# Patient Record
Sex: FEMALE | URBAN - METROPOLITAN AREA
[De-identification: names, ages, dates, MRNs, and addresses within clinical notes are randomized per-mention and may not be internally consistent; named-entity substitution may affect disease eponyms.]

---

## 2018-10-02 ENCOUNTER — EMERGENCY (EMERGENCY)
Facility: HOSPITAL | Age: 48
LOS: 0 days | Discharge: HOME | End: 2018-10-02
Attending: EMERGENCY MEDICINE | Admitting: EMERGENCY MEDICINE
Payer: OTHER MISCELLANEOUS

## 2018-10-02 VITALS
DIASTOLIC BLOOD PRESSURE: 84 MMHG | SYSTOLIC BLOOD PRESSURE: 121 MMHG | OXYGEN SATURATION: 100 % | RESPIRATION RATE: 15 BRPM | TEMPERATURE: 98 F | HEART RATE: 76 BPM

## 2018-10-02 VITALS — WEIGHT: 134.92 LBS

## 2018-10-02 DIAGNOSIS — S83.91XA SPRAIN OF UNSPECIFIED SITE OF RIGHT KNEE, INITIAL ENCOUNTER: ICD-10-CM
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
## 2018-10-02 PROCEDURE — 99283 EMERGENCY DEPT VISIT LOW MDM: CPT

## 2018-10-02 PROCEDURE — 73562 X-RAY EXAM OF KNEE 3: CPT | Mod: 26,RT

## 2018-10-02 RX ORDER — IBUPROFEN 200 MG
600 TABLET ORAL ONCE
Qty: 0 | Refills: 0 | Status: COMPLETED | OUTPATIENT
Start: 2018-10-02 | End: 2018-10-02

## 2018-10-02 RX ADMIN — Medication 600 MILLIGRAM(S): at 19:38

## 2018-10-02 NOTE — ED STATDOCS - PROGRESS NOTE DETAILS
right knee immobilizer applied. Patient is feeling much better, tests/labs reviewed. case discussed with attending. OK to dc home. LJ Mcgovern

## 2018-10-02 NOTE — ED ADULT NURSE NOTE - NSIMPLEMENTINTERV_GEN_ALL_ED
Implemented All Universal Safety Interventions:  [place] to call system. Call bell, personal items and telephone within reach. Instruct patient to call for assistance. Room bathroom lighting operational. Non-slip footwear when patient is off stretcher. Physically safe environment: no spills, clutter or unnecessary equipment. Stretcher in lowest position, wheels locked, appropriate side rails in place.

## 2018-10-02 NOTE — ED STATDOCS - OBJECTIVE STATEMENT
49 y/o female with no relevant PMHx presents to the ED c/o knee pain. Pt notes she was at work, and heard "a bubble pop" in her right knee. Pt notes she can bend her knee but can not bear weight. No history of meniscus tears. Denies back pain, neck pain. Has not taken any medication for pain. Allergies: Penicillin. No other complaints at this time.

## 2018-10-02 NOTE — ED STATDOCS - PHYSICAL EXAMINATION
GEN: AOX3, NAD. HEENT: Throat clear. Head NC/AT. NECK: Supple, No JVD. FROM. C-spine non-tender. CV:S1S2, RRR, LUNGS: CTA b/l, no w/r/r. ABD: Soft, NT/ND, no rebound, no guarding. No CVAT. EXT: No e/c/c. 2+ distal pulses. RIGHT KNEE: +Minimal STS anterior right knee. +Tenderness. Painful ROM. No bony deformity. No obvious ligament laxity Able to straight raise and bear weight RLE with mild pain. SKIN: No rashes. NEURO: No focal deficits. CN II-XII intact. FROM. 5/5 motor and sensory. LJ Mcgovern

## 2018-10-02 NOTE — ED ADULT TRIAGE NOTE - CHIEF COMPLAINT QUOTE
Pt presents to ED c/o right knee pain. Pt reports she was walking down the hallway and "heard a pop".

## 2018-10-02 NOTE — ED STATDOCS - CARE PROVIDER_API CALL
Lester Tse (MD), Orthopaedic Surgery  [address] [place] [address]  Phone: [phone]  Fax: [phone]

## 2018-10-02 NOTE — ED STATDOCS - ATTENDING CONTRIBUTION TO CARE
I, Dean Garcia, performed the initial face to face bedside interview with this patient regarding history of present illness, review of symptoms and relevant past medical, social and family history.  I completed an independent physical examination.  I was the initial provider who evaluated this patient. I have signed out the follow up of any pending tests (i.e. labs, radiological studies) to the ACP.  I have communicated the patient’s plan of care and disposition with the ACP.  The history, relevant review of systems, past medical and surgical history, medical decision making, and physical examination was documented by the scribe in my presence and I attest to the accuracy of the documentation.

## 2019-08-05 ENCOUNTER — OUTPATIENT (OUTPATIENT)
Dept: INPATIENT UNIT | Facility: HOSPITAL | Age: 49
LOS: 1 days | Discharge: HOME | End: 2019-08-05
Payer: COMMERCIAL

## 2019-08-05 ENCOUNTER — RESULT REVIEW (OUTPATIENT)
Age: 49
End: 2019-08-05

## 2019-08-05 VITALS
HEIGHT: 64 IN | WEIGHT: 142.42 LBS | RESPIRATION RATE: 16 BRPM | DIASTOLIC BLOOD PRESSURE: 77 MMHG | OXYGEN SATURATION: 97 % | SYSTOLIC BLOOD PRESSURE: 113 MMHG | HEART RATE: 92 BPM | TEMPERATURE: 98 F

## 2019-08-05 VITALS
RESPIRATION RATE: 16 BRPM | TEMPERATURE: 98 F | DIASTOLIC BLOOD PRESSURE: 71 MMHG | OXYGEN SATURATION: 99 % | HEART RATE: 66 BPM | SYSTOLIC BLOOD PRESSURE: 115 MMHG

## 2019-08-05 DIAGNOSIS — N30.20 OTHER CHRONIC CYSTITIS WITHOUT HEMATURIA: ICD-10-CM

## 2019-08-05 DIAGNOSIS — C67.3 MALIGNANT NEOPLASM OF ANTERIOR WALL OF BLADDER: ICD-10-CM

## 2019-08-05 DIAGNOSIS — E78.5 HYPERLIPIDEMIA, UNSPECIFIED: ICD-10-CM

## 2019-08-05 DIAGNOSIS — J45.909 UNSPECIFIED ASTHMA, UNCOMPLICATED: ICD-10-CM

## 2019-08-05 DIAGNOSIS — N32.89 OTHER SPECIFIED DISORDERS OF BLADDER: ICD-10-CM

## 2019-08-05 DIAGNOSIS — D41.4 NEOPLASM OF UNCERTAIN BEHAVIOR OF BLADDER: ICD-10-CM

## 2019-08-05 DIAGNOSIS — E11.9 TYPE 2 DIABETES MELLITUS WITHOUT COMPLICATIONS: ICD-10-CM

## 2019-08-05 DIAGNOSIS — Z88.0 ALLERGY STATUS TO PENICILLIN: ICD-10-CM

## 2019-08-05 PROCEDURE — 88307 TISSUE EXAM BY PATHOLOGIST: CPT

## 2019-08-05 PROCEDURE — 88342 IMHCHEM/IMCYTCHM 1ST ANTB: CPT | Mod: 26

## 2019-08-05 PROCEDURE — 88342 IMHCHEM/IMCYTCHM 1ST ANTB: CPT

## 2019-08-05 PROCEDURE — 94640 AIRWAY INHALATION TREATMENT: CPT

## 2019-08-05 PROCEDURE — 88307 TISSUE EXAM BY PATHOLOGIST: CPT | Mod: 26

## 2019-08-05 PROCEDURE — 82962 GLUCOSE BLOOD TEST: CPT

## 2019-08-05 RX ORDER — FENTANYL CITRATE 50 UG/ML
25 INJECTION INTRAVENOUS
Refills: 0 | Status: DISCONTINUED | OUTPATIENT
Start: 2019-08-05 | End: 2019-08-05

## 2019-08-05 RX ORDER — FAMOTIDINE 10 MG/ML
20 INJECTION INTRAVENOUS ONCE
Refills: 0 | Status: COMPLETED | OUTPATIENT
Start: 2019-08-05 | End: 2019-08-05

## 2019-08-05 RX ORDER — OXYCODONE HYDROCHLORIDE 5 MG/1
5 TABLET ORAL ONCE
Refills: 0 | Status: DISCONTINUED | OUTPATIENT
Start: 2019-08-05 | End: 2019-08-05

## 2019-08-05 RX ORDER — ACETAMINOPHEN 500 MG
975 TABLET ORAL ONCE
Refills: 0 | Status: COMPLETED | OUTPATIENT
Start: 2019-08-05 | End: 2019-08-05

## 2019-08-05 RX ORDER — ONDANSETRON 8 MG/1
4 TABLET, FILM COATED ORAL ONCE
Refills: 0 | Status: DISCONTINUED | OUTPATIENT
Start: 2019-08-05 | End: 2019-08-05

## 2019-08-05 RX ORDER — FENTANYL CITRATE 50 UG/ML
50 INJECTION INTRAVENOUS
Refills: 0 | Status: DISCONTINUED | OUTPATIENT
Start: 2019-08-05 | End: 2019-08-05

## 2019-08-05 RX ORDER — ALBUTEROL 90 UG/1
2.5 AEROSOL, METERED ORAL ONCE
Refills: 0 | Status: COMPLETED | OUTPATIENT
Start: 2019-08-05 | End: 2019-08-05

## 2019-08-05 RX ORDER — EMPAGLIFLOZIN 10 MG/1
1 TABLET, FILM COATED ORAL
Qty: 0 | Refills: 0 | DISCHARGE

## 2019-08-05 RX ADMIN — Medication 975 MILLIGRAM(S): at 08:18

## 2019-08-05 RX ADMIN — ALBUTEROL 2.5 MILLIGRAM(S): 90 AEROSOL, METERED ORAL at 07:47

## 2019-08-05 RX ADMIN — FAMOTIDINE 20 MILLIGRAM(S): 10 INJECTION INTRAVENOUS at 08:17

## 2019-08-05 RX ADMIN — Medication 975 MILLIGRAM(S): at 08:17

## 2019-08-05 NOTE — ASU DISCHARGE PLAN (ADULT/PEDIATRIC) - CARE PROVIDER_API CALL
Yayo Bach)  Urology  [address] [place] [address]  Phone: [phone]  Fax: [phone]  Follow Up Time:

## 2019-08-05 NOTE — BRIEF OPERATIVE NOTE - NSICDXBRIEFPROCEDURE_GEN_ALL_CORE_FT
PROCEDURES:  Cystoscopy with transurethral resection of prostate (TURP) and transurethral resection of bladder tumor (TURBT) 05-Aug-2019 09:33:48  Yayo Bach

## 2019-08-05 NOTE — ASU PATIENT PROFILE, ADULT - PMH
Asthma    Diabetes 1.5, managed as type 2    Hyperlipidemia associated with type 2 diabetes mellitus    No pertinent past medical history

## 2021-01-13 ENCOUNTER — OUTPATIENT (OUTPATIENT)
Dept: OUTPATIENT SERVICES | Facility: HOSPITAL | Age: 51
LOS: 1 days | End: 2021-01-13
Payer: COMMERCIAL

## 2021-01-13 DIAGNOSIS — Z20.828 CONTACT WITH AND (SUSPECTED) EXPOSURE TO OTHER VIRAL COMMUNICABLE DISEASES: ICD-10-CM

## 2021-01-13 LAB — SARS-COV-2 RNA SPEC QL NAA+PROBE: SIGNIFICANT CHANGE UP

## 2021-01-13 PROCEDURE — C9803: CPT

## 2021-01-13 PROCEDURE — U0005: CPT

## 2021-01-13 PROCEDURE — U0003: CPT

## 2021-01-14 DIAGNOSIS — Z20.828 CONTACT WITH AND (SUSPECTED) EXPOSURE TO OTHER VIRAL COMMUNICABLE DISEASES: ICD-10-CM

## 2021-01-14 PROBLEM — E13.9 OTHER SPECIFIED DIABETES MELLITUS WITHOUT COMPLICATIONS: Chronic | Status: ACTIVE | Noted: 2019-08-05

## 2021-01-14 PROBLEM — E11.69 TYPE 2 DIABETES MELLITUS WITH OTHER SPECIFIED COMPLICATION: Chronic | Status: ACTIVE | Noted: 2019-08-05

## 2021-01-14 PROBLEM — J45.909 UNSPECIFIED ASTHMA, UNCOMPLICATED: Chronic | Status: ACTIVE | Noted: 2019-08-05

## 2021-07-13 NOTE — ED ADULT NURSE NOTE - NSSISCREENINGQ2_ED_A_ED
Referred by: Toñito Morales MD; Medical Diagnosis (from order):    Diagnosis Information      Diagnosis    723.4 (ICD-9-CM) - M54.12 (ICD-10-CM) - Cervical radiculopathy                Physical Therapy -  Initial Evaluation    Visit:  1   Treatment Diagnosis: cervical, left: upper extremity, symptoms with increased pain/symptoms, impaired range of motion, impaired strength, impaired joint play/mobility, impaired mobility, impaired muscle length/flexibility, impaired scapulohumeral rhythm  Date of onset: 1/6/2021  Chart reviewed at time of initial evaluation (relevant co-morbidities, allergies, tests and medications listed): Past Medical History:  No date: Dislocation of metacarpal joint of right hand  No date: Fibula fracture      Comment:  left  No date: PONV (postoperative nausea and vomiting)  08/10/2016: Tobacco use disorder      Comment:  quit 2018  Past Surgical History:  No date: Ankle surgery; Left  09/14/2018: Anterior cervical discectomy w/ fusion      Comment:  C4-5, C5-6  No date: Carpal tunnel release      Comment:  bilateral  No date: Hand surgery; Right      Comment:  metacarpal reduction  Diagnostic Tests: X-ray: reviewed.      SUBJECTIVE                                                                                                             Patient presents to physical therapy with neck pain. Patient states that pain level is not as bad as it once was, but she has numbness and tingling down the left upper extremity.   She is status post a previous C4-C5 and C5-C6 anterior cervical diskectomy and fusion in 2018.  Pain / Symptoms:  Pain/symptom is: constant  Pain rating (out of 10): Current: 2 ; Worst: 10  Location:     Quality / Description: radiating, tingling, stiff.  Alleviating Factors: change in position.   Progression since onset: worsening  Function:   Limitations / Exacerbation Factors: pain, difficulty, increased time, sleep disturbed, grocery shopping, driving/riding in a vehicle,  house/yard work, sitting tasks, lifting/carrying and sitting  Prior Level of Function: pain free ADLs and IADLs,  Patient Goals: decreased pain, increased motion and increased strength    Prior treatment: no therapies  Discharged from hospital, home health, or skilled nursing facility in last 30 days: no    Home Environment:   Patient lives with alone  Type of home: single level home  Assistance available: consistent  Feels safe at home/work/school.  Does not does not want to talk about it.  2 or more falls or an unexplained fall with injury in the last year:  No    OBJECTIVE                                                                                                                     Range of Motion (ROM)   (degrees unless noted; active unless noted; norms in ( ); negative=lacking to 0, positive=beyond 0)   Cervical:    - Flexion (45-50): 58°    - Extension (45-60): 30°    - Rotation (60-80):        • Left: 59°        • Right: 52°    - Side Bend (45):        • Left: 25° pain        • Right: 30° pain    Strength  (out of 5 unless noted, standard test position unless noted, lbs tested with hand held dynamometer)   Shoulder:     - Flexion:       • Left: max effort: 14.6 lbs      • Right: max effort: 11.9 lbs    - Abduction:      • Right: max effort: 8 lbs     - Scaption:         • Left:  max effort: 12.2 lbs     - Internal Rotation:        • Left (at 0°): max effort: 17.9 lbs      • Right (at 0°): max effort: 14.2 lbs    - External Rotation:       • Left (at 0°): max effort: 9.9 lbs      • Right (at 0°): max effort: 15.4 lbs     Special Tests:  Median Nerve Tension Test: Left: positive Right: negative  Cervical Distraction in sitting: Left: positive Right: negative  Spurling's Test: Left: positive Right: negative    Outcome Measures:   Neck Disability Index (NDI): Neck Disability Index Score: 7  NDI Total Possible Score: 50  NDI Score Calculated: 14 %  (scored 0-100, higher score indicates higher disability) see  flowsheet for additional documentation    TREATMENT                                                                                                                initial evaluation completed    Therapeutic Exercise:  Cervical Extension AROM with Strap  3 sets - 10 reps  Gentle Levator Scapulae Stretch  3 sets - 30 hold  Seated Upper Trapezius Stretch  3 sets - 30 hold      Skilled input: verbal instruction/cues, as detailed above and tactile instruction/cues    Writer verbally educated and received verbal consent for hand placement, positioning of patient, and techniques to be performed today from patient for clothing adjustments for techniques, hand placement and palpation for techniques and therapist position for techniques as described above and how they are pertinent to the patient's plan of care.    Home Exercise Program: Access Code: [email or web address]  URL: [email or web address]  Date: 07/13/2021  Prepared by: Arthur Tariq    Exercises  Cervical Extension AROM with Strap - 2 x daily - 7 x weekly - 3 sets - 10 reps  Gentle Levator Scapulae Stretch - 2 x daily - 3 sets - 30 hold  Seated Upper Trapezius Stretch - 2 x daily - 3 sets - 30 hold       ASSESSMENT                                                                                                           50 year old female patient has reported functional limitations listed above impacted by signs and symptoms consistent with below   • Involved: cervical      - left upper extremity   • Symptoms/impairments: increased pain/symptoms, impaired range of motion, impaired strength, impaired joint play/mobility, impaired mobility, impaired muscle length/flexibility and impaired scapulohumeral rhythm  Patient demonstrates signs and symptoms of cervical radiculopathy. At the level of C6 - C7. Hx of spinal fusion at C5-C6.    Prognosis: patient will benefit from skilled therapy  Rehabilitative potential is: good  Predicted patient presentation:  Moderate (evolving) - Patient comorbidities and complexities, as defined above, may have varying impact on steady progress for prescribed plan of care.  Patient Education:   Who will be receiving education: patient and patient's significant other  Are they ready to learn: yes  Preferred learning style: written and verbal  Barriers to learning: no barriers apparent at this time  Results of above outlined education: Verbalizes understanding and Demonstrates understanding      PLAN                                                                                                                         The following skilled interventions to be implemented to achieve goals listed below:  Gait Training (43573)  Manual Therapy (13943)  Neuromuscular Re-Education (30750)  Therapeutic Activity (13266)  Therapeutic Exercise (01647)  Electrical Stimulation (54605//16671)  Heat/Cold (06527)  Mechanical Traction (06523)  Ultrasound/Phonophoresis (32028)    Frequency / Duration: 2 times per week tapering as patient progresses for an estimated total of 12 visits for 6 weeks    Patient involved in and agreed to plan of care and goals.  Patient given attendance policy at time of initial evaluation.  Suggestions for next session as indicated: Progress per plan of care  Manual traction  Thermal US as needed  STM to cervical musculature  Deep neck flexor strengthening        GOALS                                                                                                                           Long Term Goals: to be met by end of plan of care  1. Patient will be independent with progressed and modified home exercise program   2. Decrease pain/symptoms to 3/10  3. Improve involved strength to 5/5  4. Improve involved range of motion to cervical rotation  5. Be able to sit for 60 minutes without pain/difficulty to improve activities of independent daily living           Therapy procedure time and total treatment time can be  found documented on the Time Entry flowsheet   No

## 2021-12-20 ENCOUNTER — EMERGENCY (EMERGENCY)
Facility: HOSPITAL | Age: 51
LOS: 0 days | Discharge: HOME | End: 2021-12-20
Attending: EMERGENCY MEDICINE
Payer: COMMERCIAL

## 2021-12-20 VITALS
TEMPERATURE: 98 F | HEART RATE: 78 BPM | RESPIRATION RATE: 19 BRPM | DIASTOLIC BLOOD PRESSURE: 85 MMHG | OXYGEN SATURATION: 100 % | SYSTOLIC BLOOD PRESSURE: 133 MMHG

## 2021-12-20 VITALS — HEIGHT: 64 IN | WEIGHT: 145.06 LBS
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
## 2021-12-20 DIAGNOSIS — E78.5 HYPERLIPIDEMIA, UNSPECIFIED: ICD-10-CM

## 2021-12-20 DIAGNOSIS — S52.612A DISPLACED FRACTURE OF LEFT ULNA STYLOID PROCESS, INITIAL ENCOUNTER FOR CLOSED FRACTURE: ICD-10-CM

## 2021-12-20 DIAGNOSIS — M79.602 PAIN IN LEFT ARM: ICD-10-CM

## 2021-12-20 DIAGNOSIS — S49.92XA UNSPECIFIED INJURY OF LEFT SHOULDER AND UPPER ARM, INITIAL ENCOUNTER: ICD-10-CM
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
## 2021-12-20 DIAGNOSIS — Z79.84 LONG TERM (CURRENT) USE OF ORAL HYPOGLYCEMIC DRUGS: ICD-10-CM

## 2021-12-20 DIAGNOSIS — S52.502A UNSPECIFIED FRACTURE OF THE LOWER END OF LEFT RADIUS, INITIAL ENCOUNTER FOR CLOSED FRACTURE: ICD-10-CM

## 2021-12-20 DIAGNOSIS — Z88.8 ALLERGY STATUS TO OTHER DRUGS, MEDICAMENTS AND BIOLOGICAL SUBSTANCES STATUS: ICD-10-CM

## 2021-12-20 DIAGNOSIS — E13.9 OTHER SPECIFIED DIABETES MELLITUS WITHOUT COMPLICATIONS: ICD-10-CM

## 2021-12-20 DIAGNOSIS — Z88.0 ALLERGY STATUS TO PENICILLIN: ICD-10-CM

## 2021-12-20 PROCEDURE — 73110 X-RAY EXAM OF WRIST: CPT | Mod: 26,LT,77

## 2021-12-20 PROCEDURE — 73090 X-RAY EXAM OF FOREARM: CPT | Mod: LT

## 2021-12-20 PROCEDURE — 25605 CLTX DST RDL FX/EPHYS SEP W/: CPT | Mod: LT

## 2021-12-20 PROCEDURE — 73110 X-RAY EXAM OF WRIST: CPT | Mod: LT

## 2021-12-20 PROCEDURE — 73090 X-RAY EXAM OF FOREARM: CPT | Mod: 26,LT

## 2021-12-20 PROCEDURE — 99285 EMERGENCY DEPT VISIT HI MDM: CPT | Mod: 25

## 2021-12-20 PROCEDURE — 73110 X-RAY EXAM OF WRIST: CPT | Mod: 26,LT

## 2021-12-20 PROCEDURE — 99284 EMERGENCY DEPT VISIT MOD MDM: CPT

## 2021-12-20 RX ORDER — OXYCODONE HYDROCHLORIDE 5 MG/1
5 TABLET ORAL ONCE
Refills: 0 | Status: DISCONTINUED | OUTPATIENT
Start: 2021-12-20 | End: 2021-12-20

## 2021-12-20 RX ORDER — OXYCODONE HYDROCHLORIDE 5 MG/1
1 TABLET ORAL
Qty: 12 | Refills: 0
Start: 2021-12-20 | End: 2021-12-22

## 2021-12-20 RX ORDER — OXYCODONE AND ACETAMINOPHEN 5; 325 MG/1; MG/1
1 TABLET ORAL ONCE
Refills: 0 | Status: COMPLETED | OUTPATIENT
Start: 2021-12-20 | End: 2021-12-20

## 2021-12-20 RX ADMIN — OXYCODONE HYDROCHLORIDE 5 MILLIGRAM(S): 5 TABLET ORAL at 20:21

## 2021-12-20 NOTE — ED ADULT NURSE NOTE - OBJECTIVE STATEMENT
Pt. reports to ed for wrist injury. Pt. reports falling off flower bed today on left wrist and hit head lightly. Pt. denies blood thinners or LOC. Pt. has movement of fingers, sensation of hand and fingers.

## 2021-12-20 NOTE — ED STATDOCS - OBJECTIVE STATEMENT
50 y/o female presents to the ED s/p fall for evaluation of left arm injury and pain. Patient reports she was putting up lights, fell back, landed on left arm then hit head very lightly. patient denies LOC. Denies other injury or complaints at this time.

## 2021-12-20 NOTE — ED STATDOCS - NS_ ATTENDINGSCRIBEDETAILS _ED_A_ED_FT
I, Erasmo Maloney MD,  performed the initial face to face bedside interview with this patient regarding history of present illness, review of symptoms and relevant past medical, social and family history.  I completed an independent physical examination.  I was the initial provider who evaluated this patient.  The history, relevant review of systems, past medical and surgical history, medical decision making, and physical examination was documented by the scribe in my presence and I attest to the accuracy of the documentation.

## 2021-12-20 NOTE — CONSULT NOTE ADULT - SUBJECTIVE AND OBJECTIVE BOX
51y L Hand Dominant. Female patient presents to [facility] ED c/o severe L wrist pain s/p mechanical fall. Patient denies head hit or LOC. Localizing pain to distal radius. Denies radiation of pain. Pt denies numbness, tingling or burning. Patient denies any other injuries.    PMH:  No pertinent past medical history    Asthma    Diabetes 1.5, managed as type 2    Hyperlipidemia associated with type 2 diabetes mellitus      PSH:  No significant past surgical history      AH:  Bydureon (Other)  penicillin (Unknown)    Meds: See med rec    T(C): 36.8 (12-20-21 @ 19:55)  HR: 78 (12-20-21 @ 19:55)  BP: 133/85 (12-20-21 @ 19:55)  RR: 19 (12-20-21 @ 19:55)  SpO2: 100% (12-20-21 @ 19:55)  Wt(kg): --    PE :  Gen: NAD, A/Ox3, Following commands    L UE:  Skin intact,  visible deformity of wrist, + soft tissue swelling, no ecchymosis  Decreased ROM of Wrist 2/2 pain  Normal Elbow/Shoulder ROM w/o pain  + TTP over distal radius  No Snuff box TTP  + Rad Pulse   SILT C5-T1  +AIN/PIN/Ulnar/Radial/Musc/Median  compartments soft and compressible    Secondary Survey:   No TTP over bony prominences, SILT, palpable pulses, full/painless A/PROM, compartments soft. No TTP over spinous processes or paraspinal muscles at C/T/L spine. No palpable step off. No other injuries or complaints. Able to SLR BL. Negative logroll/heelstrike BL. Ambulating w/o assistance/pain.    Imaging:  XRay L Wrist  3 views of L Wrist demonstrates L distal radius fracture w/ posterior displacement of carpus/hand in relation to forearm. No other fx/dislocations noted.     Procedure Note:  After verbal consent obtained, 10 cc of 1% Lidocaine injected into area around DR/Ulna as hematoma block. UE hung by fingers and reduction maneuver performed. A well padded sugartong splint was applied to Forearm/Wrist and mold held. NV XR obtained which show improved alignment of Fracture. Post reduction NV exam unchanged. Pt tolerated procedure well.    A/P:   51yFemale s/p Mech Fall w/ L Distal Radius Fracture s/p closed reduction and splinting.    -Pt advised to remove all jewelry from affected limb.  -Pain control  -Strict Ice/Elevation to help with swelling  -NWB L UE with splint and sling  -Keep splint clean/dry/intact;  -Encourage active finger motion to help with swelling  -Pt aware of possible need for surgical intervention of distal radius fracture. Will FU as outpatient  -Recommend Ca & Vit D supplementation  -Recommend DEXA scan/Osteoporosis workup outpatient and treatment as needed  -Recommend FU w/ outpatient endocrinologist   -Pt made aware of signs and symptoms of compartment syndrome and acute carpal tunnel syndrome. Aware of need to return to ED if symptoms develop.  -Recommend follow up outpatient w/ Dr. Cabrera in 2-3 days. Call office to schedule appointment.  -All Patient's and Family Member's questions answered. Patient/family understand and agree w/ plan.  -Imaging and clinical presentation discussed w/ Dr. Cabrera who is aware and agrees w/ above plan.    Viraj Wallace M.D.   Orthopaedic Surgery  [facility] [phone]  [place] [id number]  [facility] [place] [id number]  [facility] [phone]

## 2021-12-20 NOTE — ED STATDOCS - PATIENT PORTAL LINK FT
You can access the FollowMyHealth Patient Portal offered by [facility] by registering at the following website: [email or web address] By joining [facility]’s FollowMyHealth portal, you will also be able to view your health information using other applications (apps) compatible with our system.

## 2021-12-20 NOTE — ED STATDOCS - NSFOLLOWUPINSTRUCTIONS_ED_ALL_ED_FT
Please follow up with Orthopedics within one week. May take Ibuprofen/tylenol every 6 hours as needed for pain. Return to ED immediately for any new or concerning symptoms or worsening symptoms    WRIST FRACTURE IN ADULTS - AfterCare(R) Instructions(ER/ED)           Wrist Fracture in Adults    WHAT YOU NEED TO KNOW:    A wrist fracture is a break in one or more of the bones in your wrist.     Adult Arm Bones         DISCHARGE INSTRUCTIONS:    Return to the emergency department if:   •Your pain gets worse or does not get better after you take pain medicine.      •Your cast or splint breaks, gets wet, or is damaged.      •Your hand or fingers feel numb or cold.      •Your hand or fingers turn white or blue.      •Your splint or cast feels too tight.      •You have more pain or swelling after the cast or splint is put on.      Call your doctor if:   •You have a fever.      •There is a foul smell or blood coming from under the cast.      •You have questions or concerns about your condition or care.      Medicines: You may need any of the following:   •Prescription pain medicine may be given. Ask your healthcare provider how to take this medicine safely. Some prescription pain medicines contain acetaminophen. Do not take other medicines that contain acetaminophen without talking to your healthcare provider. Too much acetaminophen may cause liver damage. Prescription pain medicine may cause constipation. Ask your healthcare provider how to prevent or treat constipation.       •NSAIDs, such as ibuprofen, help decrease swelling, pain, and fever. NSAIDs can cause stomach bleeding or kidney problems in certain people. If you take blood thinner medicine, always ask your healthcare provider if NSAIDs are safe for you. Always read the medicine label and follow directions.      •Acetaminophen decreases pain and fever. It is available without a doctor's order. Ask how much to take and how often to take it. Follow directions. Read the labels of all other medicines you are using to see if they also contain acetaminophen, or ask your doctor or pharmacist. Acetaminophen can cause liver damage if not taken correctly. Do not use more than 4 grams (4,000 milligrams) total of acetaminophen in one day.       •Take your medicine as directed. Contact your healthcare provider if you think your medicine is not helping or if you have side effects. Tell him or her if you are allergic to any medicine. Keep a list of the medicines, vitamins, and herbs you take. Include the amounts, and when and why you take them. Bring the list or the pill bottles to follow-up visits. Carry your medicine list with you in case of an emergency.      Self-care:   •Rest as much as possible. Do not play contact sports until the healthcare provider says it is okay.      •Apply ice on your wrist for 15 to 20 minutes every hour or as directed. Use an ice pack, or put crushed ice in a plastic bag. Cover it with a towel before you place it on your skin. Ice helps prevent tissue damage and decreases swelling and pain.      •Elevate your wrist above the level of your heart as often as possible. This will help decrease swelling and pain. Prop your wrist on pillows or blankets to keep it elevated comfortably.             Cast or splint care:   •You may take a bath or shower as directed. Do not let your cast or splint get wet. Before bathing, cover the cast or splint with 2 plastic trash bags. Tape the bags to your skin above the cast or splint to seal out the water. Keep your arm out of the water in case the bag breaks. If a plaster cast gets wet and soft, call your healthcare provider.      •Check the skin around the cast or splint every day. You may put lotion on any red or sore areas.      •Do not push down or lean on the cast or brace because it may break.      •Do not scratch the skin under the cast by putting a sharp or pointed object inside the cast.      Go to physical therapy as directed: You may need physical therapy after your wrist heals and the cast is removed. A physical therapist can teach you exercises to help improve movement and strength and to decrease pain.    Follow up with your doctor or bone specialist as directed: You may need to return to have your cast removed. You may also need an x-ray to check how well the bone has healed. Write down your questions so you remember to ask them during your visits.    Splint Care    WHAT YOU NEED TO KNOW:    Splint care is important to help protect your splint until it comes off. Some splints are made of fiberglass or plaster that will need to dry and harden. Splint care will help the splint dry and harden correctly. Even after your splint hardens, it can be damaged.    DISCHARGE INSTRUCTIONS:    Return to the emergency department if:     You have increased pain.      Your fingers or toes are numb or tingling.      You feel burning or stinging around your injury.      Your nails, fingers, or toes turn pale, blue, or gray, and feel cold.      You have new or increased trouble moving your fingers or toes.      Your swelling gets worse.      The skin under your splint is bleeding or leaking pus.     Contact your healthcare provider if:     Your hard splint gets wet or is damaged.      You have a fever.      Your splint feels tighter.      You have itchy, dry skin under your splint that is getting worse.      The skin under your splint is red, or you have a new sore.      You notice a bad smell coming from your splint.       You have questions or concerns about your condition or care.    How to care for your splint:     Wait for your hard splint to harden completely. You may have to wait up to 3 days before you can walk on a plaster splint.      Check your splint and the skin around it each day. Check your splint for damage, such as cracks and breaks. Check your skin for redness, increased swelling, and sores. Loosen the elastic bandage around your splint if it feels too tight.      Keep your splint clean and dry. Keep dirt out of your splint. Before you bathe, wrap your hard splint with 2 layers of plastic. Then put a plastic bag over it. Keep the plastic bag tightly sealed. You can also ask your healthcare provider about waterproof shields. Do not put your hard splint in the water, even with a plastic bag over it. A wet splint can make your skin itchy, and may lead to infection.      Do not put powders or deodorants inside your splint. These can dry your skin and increase itching.       Do not try to scratch the skin inside your hard splint with sharp objects. Sharp objects can break off inside your splint or hurt your skin.       Do not pull the padding out of your splint. The padding inside your splint protects your skin. You may develop a sore on your skin if you take out the padding.    Follow up with your healthcare provider as directed within 1 to 2 weeks: Write down your questions so you remember to ask them during your visits.

## 2021-12-20 NOTE — ED ADULT TRIAGE NOTE - CHIEF COMPLAINT QUOTE
Patient comes to ED for left arm injury. patient reports putting up lights, fell back, landed on left arm then hit head very lightly. patient denies LOC, denies blood thinners. patient reports left forearm pain,. took 3 motrin prior to arrival

## 2021-12-20 NOTE — ED STATDOCS - NS ED ROS FT
Constitutional: No fever or chills  Eyes: No visual changes  HEENT: No throat pain  CV: No chest pain  Resp: No SOB no cough  GI: No abd pain, nausea or vomiting  : No dysuria  MSK: left arm pain  Skin: No rash  Neuro: No headache

## 2021-12-20 NOTE — ED STATDOCS - CARE PROVIDER_API CALL
Blaise Cabrera)  Orthopaedic Surgery  [address], [address]  [place] [address]  Phone: [phone]  Fax: [phone]  Follow Up Time:

## 2021-12-20 NOTE — ED STATDOCS - PROGRESS NOTE DETAILS
50 y/o F presents ith arm pain. Pt was putting up lights and fell back landing on L arm. Complaining of pain to l wrist. No other complaints at this time. -Regulo Pang PA-C Discussed with ortho resident and call placed to . -Regulo Pang PA-C 52 y/o F presents with arm pain. Pt was putting up lights and fell back landing on L arm. Complaining of pain to l wrist. No other complaints at this time. -Regulo Pang PA-C seen by ortho - ok to follow up with Dr. Cabrera in office. will dc with follow up and strict return precautions. Erasmo Maloney M.D., Attending Physician

## 2021-12-20 NOTE — ED STATDOCS - NSICDXPASTMEDICALHX_GEN_ALL_CORE_FT
PAST MEDICAL HISTORY:  Asthma     Diabetes 1.5, managed as type 2     Hyperlipidemia associated with type 2 diabetes mellitus

## 2021-12-20 NOTE — ED STATDOCS - CLINICAL SUMMARY MEDICAL DECISION MAKING FREE TEXT BOX
52 yo female presents to the ED s/p  fall onto left wrist. Examshows deformity left distal radius & normal pulses. Will obtain XR and ortho.

## 2021-12-20 NOTE — ED STATDOCS - PHYSICAL EXAMINATION
Constitutional: NAD AAOx3  Eyes: PERRLA EOMI  Head: Normocephalic atraumatic  Mouth: MMM  Cardiac: regular rate   Resp: Lungs CTAB  GI: Abd s/nt/nd  MSK: deformity left distal radius, normal pulses   Neuro: CN2-12 intact  Skin: No rashes Constitutional: mild distress AAOx3  Eyes: PERRLA EOMI  Head: Normocephalic atraumatic  Mouth: MMM  Cardiac: regular rate   Resp: Lungs CTAB  GI: Abd s/nt/nd  MSK: deformity left distal radius, normal pulses no tenderness to shoulder elbow or hand. normal sensation  Neuro: CN2-12 intact  Skin: No rashes

## 2022-11-15 NOTE — ED ADULT NURSE NOTE - NSFALLRSKOUTCOME_ED_ALL_ED
Chief Complaint   Patient presents with    Medication Refill     Pepcid      Patient seen on 10/13/22. Fall Risk

## 2023-04-19 ENCOUNTER — RESULT CHARGE (OUTPATIENT)
Age: 53
End: 2023-04-19

## 2023-04-19 ENCOUNTER — APPOINTMENT (OUTPATIENT)
Dept: OBGYN | Facility: CLINIC | Age: 53
End: 2023-04-19
Payer: COMMERCIAL

## 2023-04-19 VITALS
WEIGHT: 144 LBS | HEART RATE: 85 BPM | SYSTOLIC BLOOD PRESSURE: 120 MMHG | DIASTOLIC BLOOD PRESSURE: 76 MMHG | BODY MASS INDEX: 24.72 KG/M2

## 2023-04-19 LAB
BILIRUB UR QL STRIP: NORMAL
CLARITY UR: CLEAR
COLLECTION METHOD: NORMAL
GLUCOSE UR-MCNC: NORMAL
HCG UR QL: 0.2 EU/DL
HEMOGLOBIN: 12.3
HGB UR QL STRIP.AUTO: NORMAL
KETONES UR-MCNC: NORMAL
LEUKOCYTE ESTERASE UR QL STRIP: NORMAL
NITRITE UR QL STRIP: NORMAL
PH UR STRIP: 6
PROT UR STRIP-MCNC: NORMAL
SP GR UR STRIP: 1.01

## 2023-04-19 PROCEDURE — 82270 OCCULT BLOOD FECES: CPT

## 2023-04-19 PROCEDURE — 85018 HEMOGLOBIN: CPT | Mod: QW

## 2023-04-19 PROCEDURE — 81003 URINALYSIS AUTO W/O SCOPE: CPT | Mod: QW

## 2023-04-19 PROCEDURE — 99386 PREV VISIT NEW AGE 40-64: CPT | Mod: 25

## 2023-04-20 ENCOUNTER — TRANSCRIPTION ENCOUNTER (OUTPATIENT)
Age: 53
End: 2023-04-20

## 2023-04-21 NOTE — HISTORY OF PRESENT ILLNESS
[TextBox_4] : 52 year old female presents for her annual visit. Denies GYN complaints at this time.\par hx-  , 2 vaginal deliveries\par \par

## 2023-04-21 NOTE — PHYSICAL EXAM
[Chaperone Present] : A chaperone was present in the examining room during all aspects of the physical examination [Appropriately responsive] : appropriately responsive [Alert] : alert [No Acute Distress] : no acute distress [No Lymphadenopathy] : no lymphadenopathy [Soft] : soft [Non-tender] : non-tender [Non-distended] : non-distended [No HSM] : No HSM [No Lesions] : no lesions [No Mass] : no mass [Oriented x3] : oriented x3 [Examination Of The Breasts] : a normal appearance [No Masses] : no breast masses were palpable [Labia Majora] : normal [Labia Minora] : normal [Normal] : normal [Uterine Adnexae] : normal [Normal rectal exam] : was normal [FreeTextEntry1] : EDUARDO HurtadoC [Occult Blood Positive] : was negative for occult blood analysis

## 2023-04-21 NOTE — DISCUSSION/SUMMARY
[FreeTextEntry1] : pt enjoys [employer] biking at [place]\par \par Patient to follow up in 1 year for annual GYN exam\par Mammogram due: now; Rx given\par Colonoscopy due: pt is scheduling with [facility]\par Bone density due:  last 04/2019 4.6% &  0.3%; due next year. 04/2024\par Pap ordered\par Hemoccult ordered\par All questions answered, patient is agreeable with plan.\par \par \par I Estrella ELMORE am scribing for the presence of Dr. Monroe the following sections HISTORY OF PRESENT ILLNESS, PAST MEDICAL/FAMILY/SOCIAL HISTORY; REVIEW OF SYSTEMS; VITAL SIGNS; PHYSICAL EXAM; DISPOSITION. \par \par \par I personally performed the services described in the documentation, reviewed the documentation recorded by the scribe in my presence and it accurately and completely records my words and actions.\par \par

## 2023-04-24 LAB — CYTOLOGY CVX/VAG DOC THIN PREP: NORMAL

## 2023-11-30 ENCOUNTER — APPOINTMENT (OUTPATIENT)
Dept: MAMMOGRAPHY | Facility: CLINIC | Age: 53
End: 2023-11-30
Payer: COMMERCIAL

## 2023-11-30 ENCOUNTER — RESULT REVIEW (OUTPATIENT)
Age: 53
End: 2023-11-30

## 2023-11-30 ENCOUNTER — OUTPATIENT (OUTPATIENT)
Dept: OUTPATIENT SERVICES | Facility: HOSPITAL | Age: 53
LOS: 1 days | End: 2023-11-30
Payer: COMMERCIAL

## 2023-11-30 DIAGNOSIS — Z00.00 ENCOUNTER FOR GENERAL ADULT MEDICAL EXAMINATION WITHOUT ABNORMAL FINDINGS: ICD-10-CM

## 2023-11-30 PROCEDURE — 77067 SCR MAMMO BI INCL CAD: CPT | Mod: 26

## 2023-11-30 PROCEDURE — 77063 BREAST TOMOSYNTHESIS BI: CPT | Mod: 26

## 2023-11-30 PROCEDURE — 77067 SCR MAMMO BI INCL CAD: CPT

## 2023-11-30 PROCEDURE — 77063 BREAST TOMOSYNTHESIS BI: CPT

## 2023-12-01 ENCOUNTER — NON-APPOINTMENT (OUTPATIENT)
Age: 53
End: 2023-12-01

## 2024-04-15 PROBLEM — Z12.11 COLON CANCER SCREENING: Status: ACTIVE | Noted: 2024-04-15

## 2024-04-15 PROBLEM — Z12.4 CERVICAL CANCER SCREENING: Status: ACTIVE | Noted: 2024-04-15

## 2024-04-22 ENCOUNTER — RESULT CHARGE (OUTPATIENT)
Age: 54
End: 2024-04-22

## 2024-04-22 ENCOUNTER — APPOINTMENT (OUTPATIENT)
Dept: OBGYN | Facility: CLINIC | Age: 54
End: 2024-04-22
Payer: COMMERCIAL

## 2024-04-22 VITALS
BODY MASS INDEX: 22.31 KG/M2 | HEART RATE: 76 BPM | DIASTOLIC BLOOD PRESSURE: 81 MMHG | WEIGHT: 130 LBS | SYSTOLIC BLOOD PRESSURE: 125 MMHG

## 2024-04-22 DIAGNOSIS — Z00.00 ENCOUNTER FOR GENERAL ADULT MEDICAL EXAMINATION W/OUT ABNORMAL FINDINGS: ICD-10-CM

## 2024-04-22 DIAGNOSIS — Z12.4 ENCOUNTER FOR SCREENING FOR MALIGNANT NEOPLASM OF CERVIX: ICD-10-CM

## 2024-04-22 DIAGNOSIS — Z13.820 ENCOUNTER FOR SCREENING FOR OSTEOPOROSIS: ICD-10-CM

## 2024-04-22 DIAGNOSIS — Z12.11 ENCOUNTER FOR SCREENING FOR MALIGNANT NEOPLASM OF COLON: ICD-10-CM

## 2024-04-22 DIAGNOSIS — Z01.419 ENCOUNTER FOR GYNECOLOGICAL EXAMINATION (GENERAL) (ROUTINE) W/OUT ABNORMAL FINDINGS: ICD-10-CM

## 2024-04-22 DIAGNOSIS — Z12.31 ENCOUNTER FOR SCREENING MAMMOGRAM FOR MALIGNANT NEOPLASM OF BREAST: ICD-10-CM

## 2024-04-22 LAB
BILIRUB UR QL STRIP: NEGATIVE
CLARITY UR: CLEAR
COLLECTION METHOD: NORMAL
DATE COLLECTED: NORMAL
GLUCOSE UR-MCNC: NEGATIVE
HCG UR QL: 0 EU/DL
HEMOCCULT SP1 STL QL: NEGATIVE
HEMOGLOBIN: 12.2
HGB UR QL STRIP.AUTO: NEGATIVE
KETONES UR-MCNC: NEGATIVE
LEUKOCYTE ESTERASE UR QL STRIP: NEGATIVE
NITRITE UR QL STRIP: NEGATIVE
PH UR STRIP: 6
PROT UR STRIP-MCNC: NEGATIVE
QUALITY CONTROL: YES
SP GR UR STRIP: 1

## 2024-04-22 PROCEDURE — 99459 PELVIC EXAMINATION: CPT

## 2024-04-22 PROCEDURE — 99396 PREV VISIT EST AGE 40-64: CPT

## 2024-04-25 LAB — CYTOLOGY CVX/VAG DOC THIN PREP: NORMAL

## 2024-04-26 NOTE — PHYSICAL EXAM
[Chaperone Present] : A chaperone was present in the examining room during all aspects of the physical examination [Appropriately responsive] : appropriately responsive [Alert] : alert [No Acute Distress] : no acute distress [No Lymphadenopathy] : no lymphadenopathy [Soft] : soft [Non-tender] : non-tender [Non-distended] : non-distended [No HSM] : No HSM [No Lesions] : no lesions [No Mass] : no mass [Oriented x3] : oriented x3 [Examination Of The Breasts] : a normal appearance [No Masses] : no breast masses were palpable [Labia Majora] : normal [Labia Minora] : normal [Normal] : normal [Uterine Adnexae] : normal [Normal rectal exam] : was normal [FreeTextEntry2] : Lary FNP-BC [Occult Blood Positive] : was negative for occult blood analysis

## 2024-04-26 NOTE — PLAN
[FreeTextEntry1] : Patient to follow up in 1 year for annual GYN exam at this time pt declines pharmacologic intervention for VMS Mammogram due: 11/24 Colonoscopy due: 7/28 per [facility]  Bone density due: now Pap ordered Hemoccult ordered All questions answered, patient is agreeable with plan.  I Jana Song [id number] am scribing for the presence of Dr. Monroe the following sections HISTORY OF PRESENT ILLNESS, PAST MEDICAL/FAMILY/SOCIAL HISTORY; REVIEW OF SYSTEMS; VITAL SIGNS; PHYSICAL EXAM; DISPOSITION. I personally performed the services described in the documentation, reviewed the documentation recorded by the scribe in my presence and it accurately and completely records my words and actions.

## 2024-04-26 NOTE — HISTORY OF PRESENT ILLNESS
[FreeTextEntry1] : Patient is a 53 yo female here today for annual visit. She denies any GYN complaints at this time. c/o mild VMS daily

## 2025-04-30 ENCOUNTER — APPOINTMENT (OUTPATIENT)
Dept: OBGYN | Facility: CLINIC | Age: 55
End: 2025-04-30

## 2025-04-30 VITALS
BODY MASS INDEX: 21.85 KG/M2 | HEIGHT: 64 IN | HEART RATE: 93 BPM | WEIGHT: 128 LBS | SYSTOLIC BLOOD PRESSURE: 129 MMHG | DIASTOLIC BLOOD PRESSURE: 81 MMHG

## 2025-04-30 DIAGNOSIS — Z01.419 ENCOUNTER FOR GYNECOLOGICAL EXAMINATION (GENERAL) (ROUTINE) W/OUT ABNORMAL FINDINGS: ICD-10-CM

## 2025-04-30 DIAGNOSIS — Z12.11 ENCOUNTER FOR SCREENING FOR MALIGNANT NEOPLASM OF COLON: ICD-10-CM

## 2025-04-30 LAB
BILIRUB UR QL STRIP: NEGATIVE
CLARITY UR: CLEAR
COLLECTION METHOD: NORMAL
GLUCOSE UR-MCNC: NEGATIVE
HCG UR QL: 0 EU/DL
HEMOGLOBIN: 11.5
HGB UR QL STRIP.AUTO: NEGATIVE
KETONES UR-MCNC: NEGATIVE
LEUKOCYTE ESTERASE UR QL STRIP: NEGATIVE
NITRITE UR QL STRIP: NEGATIVE
PH UR STRIP: 6
PROT UR STRIP-MCNC: NEGATIVE
SP GR UR STRIP: 1

## 2025-04-30 PROCEDURE — 99396 PREV VISIT EST AGE 40-64: CPT | Mod: 25

## 2025-04-30 PROCEDURE — 82270 OCCULT BLOOD FECES: CPT

## 2025-04-30 PROCEDURE — 85018 HEMOGLOBIN: CPT | Mod: QW

## 2025-04-30 PROCEDURE — 81003 URINALYSIS AUTO W/O SCOPE: CPT | Mod: QW

## 2025-05-05 LAB — CYTOLOGY CVX/VAG DOC THIN PREP: NORMAL
